# Patient Record
(demographics unavailable — no encounter records)

---

## 2025-01-30 NOTE — CARDIOLOGY SUMMARY
[de-identified] : 1/30/25: SR 58bpm, LVH poor R wave progression, NSST changes stable. [de-identified] : EST 09/02/21:\par  6.5 METs, no ischemia at 78% of MPHR. [de-identified] : 04/14/21:\par  LVEF 68%, G1DD\par  Mild MR\par  Mild-mod AI.

## 2025-01-30 NOTE — HISTORY OF PRESENT ILLNESS
[FreeTextEntry1] : 77 y/o female with h/o HTN, hyperlipidemia, mild-moderate AI.  Pt presents for a follow up visit. Denies CP, SOB now. She does not check at home.  Pt is active walks 2 to 3 miles per day   TTE 7/1/2024: LVEF 56%, G1DD, Asc ao 4.2, Mod AI  12/10/24 Chol 164 LDL 84 Trig 221  A1C 6.2 TSH 2.02.

## 2025-01-30 NOTE — PHYSICAL EXAM
[Well Developed] : well developed [No Acute Distress] : no acute distress [Obese] : obese [Normal Conjunctiva] : normal conjunctiva [Normal Venous Pressure] : normal venous pressure [No Carotid Bruit] : no carotid bruit [Normal S1, S2] : normal S1, S2 [No Rub] : no rub [S4] : S4 [Murmur] : murmur [Clear Lung Fields] : clear lung fields [Good Air Entry] : good air entry [No Respiratory Distress] : no respiratory distress  [Soft] : abdomen soft [Non Tender] : non-tender [Normal Bowel Sounds] : normal bowel sounds [Normal Gait] : normal gait [No Edema] : no edema [No Cyanosis] : no cyanosis [No Clubbing] : no clubbing [No Varicosities] : no varicosities [No Rash] : no rash [Moves all extremities] : moves all extremities [Normal Speech] : normal speech [Alert and Oriented] : alert and oriented [Normal memory] : normal memory [de-identified] : soft SM at the AV

## 2025-01-30 NOTE — ASSESSMENT
[FreeTextEntry1] : 76 F HTN better controlled. Mild-moderate AI. Hyperlipidemia.  Plan: Continue Bystolic and Irbesartan HCT. Continue Amlodipine 2.5 mg daily. Rosuvastatin 20 mg daily. Low-salt, low-fat diet, exercise, weight loss discussed again. Repeat blood work prior to next visit. F/u in 6 months. Will repeat ECHO after that.  Ian Ha MD

## 2025-01-30 NOTE — REVIEW OF SYSTEMS
[Negative] : Heme/Lymph [Lower Ext Edema] : no extremity edema [Leg Claudication] : no intermittent leg claudication [Palpitations] : no palpitations [Orthopnea] : no orthopnea [Rash] : no rash [Anxiety] : no anxiety